# Patient Record
Sex: MALE | Race: WHITE | NOT HISPANIC OR LATINO | Employment: PART TIME | URBAN - METROPOLITAN AREA
[De-identification: names, ages, dates, MRNs, and addresses within clinical notes are randomized per-mention and may not be internally consistent; named-entity substitution may affect disease eponyms.]

---

## 2024-02-08 ENCOUNTER — OFFICE VISIT (OUTPATIENT)
Dept: OTOLARYNGOLOGY | Facility: CLINIC | Age: 29
End: 2024-02-08
Payer: COMMERCIAL

## 2024-02-08 DIAGNOSIS — H60.391 INFECTIVE OTITIS EXTERNA OF RIGHT EAR: Primary | ICD-10-CM

## 2024-02-08 PROCEDURE — 99203 OFFICE O/P NEW LOW 30 MIN: CPT | Performed by: PHYSICIAN ASSISTANT

## 2024-02-08 NOTE — PROGRESS NOTES
Rashid Larios is a 28 y.o. year old male patient with history of otitis externa involving the right ear.  The patient is here today for assessment.  He states that he was treated on multiple occasions with drops in the right ear was also debrided by his primary care physician.  He actually reports improvement in symptoms but is here today for further assessment.  All other concerns are negative.           Review of Systems   All other systems reviewed and are negative.        Physical Exam:   General appearance: No acute distress. Normal facies. Symmetric facial movement. No gross lesions of the face are noted.  The external ear structures appear normal.  Patient with minimal cerumen bilaterally removed with curette.  The ear canals patent and the tympanic membranes are intact without evidence of air-fluid levels, retraction, or congenital defects.  Anterior rhinoscopy notes essentially a midline nasal septum. Examination is noted for normal healthy mucosal membranes without any evidence of lesions, polyps, or exudate. The tongue is normally mobile. There are no lesions on the gingiva, buccal, or oral mucosa. There are no oral cavity masses.  The neck is negative for mass lymphadenopathy. The trachea and parotid are clear. The thyroid bed is grossly unremarkable. The salivary gland structures are grossly unremarkable.    Assessment/Plan   1.  Otitis externa  Patient seen in the office today for assessment of ears with history of otitis externa.  Ears appear well today.  Patient was given reassurance in that regard and I favor observation and see us back as needed.

## 2024-08-27 ENCOUNTER — APPOINTMENT (OUTPATIENT)
Dept: OTOLARYNGOLOGY | Facility: CLINIC | Age: 29
End: 2024-08-27
Payer: COMMERCIAL

## 2024-08-27 DIAGNOSIS — H60.8X9 CHRONIC ECZEMATOUS OTITIS EXTERNA, UNSPECIFIED LATERALITY: Primary | ICD-10-CM

## 2024-08-27 PROCEDURE — 99213 OFFICE O/P EST LOW 20 MIN: CPT | Performed by: PHYSICIAN ASSISTANT

## 2024-08-27 RX ORDER — MOMETASONE FUROATE 1 MG/G
CREAM TOPICAL 2 TIMES DAILY
Qty: 15 G | Refills: 2 | Status: SHIPPED | OUTPATIENT
Start: 2024-08-27 | End: 2024-09-03

## 2024-08-27 NOTE — PROGRESS NOTES
Rashid Larios is a 29 y.o. year old male patient with Earache     Patient presents to the office today for assessment of his ears.  Patient states that he had recently put an air conditioning unit in his room and noted that his ear started to ache following using the air conditioner.  He does complain of itching and an occasional film present in the ear canals.  He did recently use neomycin eardrops which helped alleviate discomfort.  He is without other ENT related concerns.        Physical Exam:   General appearance: No acute distress. Normal facies. Symmetric facial movement. No gross lesions of the face are noted.  The external ear structures appear normal. Minimal cerumen removed bilaterally. The ear canals patent and the tympanic membranes are intact without evidence of air-fluid levels, retraction, or congenital defects.  Anterior rhinoscopy notes essentially a midline nasal septum. Examination is noted for normal healthy mucosal membranes without any evidence of lesions, polyps, or exudate. The tongue is normally mobile. There are no lesions on the gingiva, buccal, or oral mucosa. There are no oral cavity masses.  The neck is negative for mass lymphadenopathy. The trachea and parotid are clear. The thyroid bed is grossly unremarkable. The salivary gland structures are grossly unremarkable.    Procedure:  Minimal cerumen removed from each ear canal with the use of curette.    Assessment/Plan     1.  Chronic otitis externa    Patient seen in the office today for assessment of ears.  Patient with suspected chronic otitis externa with eczema component.  I recommend utilization of Elocon.  We will see the patient back as needed.

## 2024-09-17 ENCOUNTER — APPOINTMENT (OUTPATIENT)
Dept: OTOLARYNGOLOGY | Facility: CLINIC | Age: 29
End: 2024-09-17
Payer: COMMERCIAL

## 2024-09-17 DIAGNOSIS — H60.63 CHRONIC OTITIS EXTERNA OF BOTH EARS, UNSPECIFIED TYPE: Primary | ICD-10-CM

## 2024-09-17 PROCEDURE — 99213 OFFICE O/P EST LOW 20 MIN: CPT | Performed by: PHYSICIAN ASSISTANT

## 2024-09-17 NOTE — PROGRESS NOTES
Rashid Larios is a 29 y.o. year old male patient with ITCHY EAR     Patient returns to the office with itchy ear complaints.  Patient with history of chronic otitis externa.  The patient seems to have following his trigger being his air conditioning unit and finds that when using the unit it makes his ears significantly more itchy.  For the last 2 weeks he has been avoiding and has had improvement.  He was hesitant on using Elocon for fear that using a Q-tip or putting the cream in the ear might increase his risk for cerumen impaction.  He is here today for further assessment and is otherwise well without other ENT concerns.          Physical Exam:   General appearance: No acute distress. Normal facies. Symmetric facial movement. No gross lesions of the face are noted.  The external ear structures appear normal.  Low-grade chronic otitis externa noted bilaterally otherwise unremarkable the ear canals patent and the tympanic membranes are intact without evidence of air-fluid levels, retraction, or congenital defects.  Anterior rhinoscopy notes essentially a midline nasal septum. Examination is noted for normal healthy mucosal membranes without any evidence of lesions, polyps, or exudate. The tongue is normally mobile. There are no lesions on the gingiva, buccal, or oral mucosa. There are no oral cavity masses.  The neck is negative for mass lymphadenopathy. The trachea and parotid are clear. The thyroid bed is grossly unremarkable. The salivary gland structures are grossly unremarkable.    Assessment/Plan   1.  Chronic otitis externa    Patient with history of chronic otitis externa.  The patient at this time is recommended to continue use of Elocon.  I discussed utilizing cotton and his ears while he sleeps if he is running the air conditioning.  We will see him back as needed.

## 2024-11-18 ENCOUNTER — ANCILLARY PROCEDURE (OUTPATIENT)
Dept: ORTHOPEDIC SURGERY | Facility: CLINIC | Age: 29
End: 2024-11-18
Payer: COMMERCIAL

## 2024-11-18 ENCOUNTER — APPOINTMENT (OUTPATIENT)
Dept: ORTHOPEDIC SURGERY | Facility: CLINIC | Age: 29
End: 2024-11-18
Payer: COMMERCIAL

## 2024-11-18 DIAGNOSIS — M25.561 RIGHT KNEE PAIN, UNSPECIFIED CHRONICITY: ICD-10-CM

## 2024-11-18 DIAGNOSIS — M22.2X1 PATELLOFEMORAL SYNDROME, BILATERAL: Primary | ICD-10-CM

## 2024-11-18 DIAGNOSIS — M22.2X2 PATELLOFEMORAL SYNDROME, BILATERAL: Primary | ICD-10-CM

## 2024-11-18 DIAGNOSIS — M25.562 LEFT KNEE PAIN, UNSPECIFIED CHRONICITY: ICD-10-CM

## 2024-11-18 PROCEDURE — 99204 OFFICE O/P NEW MOD 45 MIN: CPT | Performed by: FAMILY MEDICINE

## 2024-11-18 ASSESSMENT — PAIN SCALES - GENERAL: PAINLEVEL_OUTOF10: 3

## 2024-11-18 ASSESSMENT — PAIN DESCRIPTION - DESCRIPTORS: DESCRIPTORS: ACHING

## 2024-11-18 ASSESSMENT — PAIN - FUNCTIONAL ASSESSMENT: PAIN_FUNCTIONAL_ASSESSMENT: 0-10

## 2024-11-18 NOTE — PROGRESS NOTES
NPV B/L knee    Subjective    Patient ID: Rashid Larios is a 29 y.o. male.    Chief Complaint: Pain of the Left Knee and Pain of the Right Knee  Rashid is a very pleasant 29-year-old male presents with an over 15-year history of right knee pain and relatively recent left knee pain.  He rates the pain as a 3 out of 10 at its worst.  He had an MCL sprain in high school the right knee.  The left knee does seem to be getting little bit better, but the right knee is about the same.  Most of his discomfort comes after being seated for an extended period of time.  It bothers him when he is in the car for too long.  He did some physical therapy and high school but has not done anything recently.  He is also stopped eating inflammatory foods, increasing his exercise, and a menthol rub.  He denies any locking, catching, or instability.  He is neurovascularly intact distally.    Objective   Musculoskeletal: Bilateral knees-there is no deformity or asymmetry on either side.  He has full range of motion.  There is no warmth, redness, or effusion bilaterally he does have a positive J sign.  There is minimal crepitus with movement bilaterally.  All cruciate and collateral ligaments appear to be intact with a stable Lachman's, negative anterior and posterior drawer, negative varus and valgus stress.    Image Results:  XR knee 4+ views bilateral  Narrative: Interpreted By:  Leonardo Guajardo,   STUDY:  XR KNEE 4+ VIEWS BILATERAL      INDICATION:  Signs/Symptoms:pain.      COMPARISON:  None      ACCESSION NUMBER(S):  [id number]      ORDERING CLINICIAN:  DENEEN BERGERON      FINDINGS:  Minimal suggested degenerative changes patellofemoral joint  bilateral. No evidence of left knee fracture or lesion.      Impression: Minimal suggested patellofemoral osteoarthritis.      Signed by: Leonardo Guajardo 11/19/2024 8:02 AM  Dictation workstation:   [id number]      Assessment/Plan   Encounter Diagnoses:  Patellofemoral syndrome, bilateral    Left  knee pain, unspecified chronicity    Right knee pain, unspecified chronicity    Orders Placed This Encounter    XR knee 4+ views bilateral    Referral to Physical Therapy     I believe this will do well with conservative treatment.  We are going to start him on formal physical therapy.  Will have a low threshold for getting further imaging if he does not improve over the next 3 to 4 weeks.  Otherwise he can continue with his current medication regimen and home exercise program.  He will follow-up with me as needed.  All of his questions were answered and he agrees with the treatment plan.    ** Please excuse any errors in grammar or translation related to this dictation. Voice recognition software was utilized to prepare this document. **    Cricket Hou M.D.  Clinical , Division of Sports Medicine  Primary Care Sports Medicine  Department of Orthopedic Surgery  [facility] [place]

## 2024-11-25 PROBLEM — M22.2X2 PATELLOFEMORAL SYNDROME, BILATERAL: Status: ACTIVE | Noted: 2024-11-25

## 2024-11-25 PROBLEM — M22.2X1 PATELLOFEMORAL SYNDROME, BILATERAL: Status: ACTIVE | Noted: 2024-11-25

## 2024-11-25 PROBLEM — M25.561 RIGHT KNEE PAIN: Status: ACTIVE | Noted: 2024-11-25

## 2024-12-05 ENCOUNTER — APPOINTMENT (OUTPATIENT)
Dept: PHYSICAL THERAPY | Facility: CLINIC | Age: 29
End: 2024-12-05
Payer: COMMERCIAL

## 2024-12-19 ENCOUNTER — APPOINTMENT (OUTPATIENT)
Dept: PHYSICAL THERAPY | Facility: CLINIC | Age: 29
End: 2024-12-19
Payer: COMMERCIAL

## 2025-01-08 ENCOUNTER — EVALUATION (OUTPATIENT)
Dept: PHYSICAL THERAPY | Facility: CLINIC | Age: 30
End: 2025-01-08
Payer: COMMERCIAL

## 2025-01-08 DIAGNOSIS — M22.2X2 PATELLOFEMORAL SYNDROME, BILATERAL: Primary | ICD-10-CM

## 2025-01-08 DIAGNOSIS — M22.2X1 PATELLOFEMORAL SYNDROME, BILATERAL: Primary | ICD-10-CM

## 2025-01-08 PROCEDURE — 97161 PT EVAL LOW COMPLEX 20 MIN: CPT | Mod: GP

## 2025-01-08 PROCEDURE — 97110 THERAPEUTIC EXERCISES: CPT | Mod: GP

## 2025-01-08 ASSESSMENT — PAIN SCALES - GENERAL: PAINLEVEL_OUTOF10: 3

## 2025-01-08 ASSESSMENT — ENCOUNTER SYMPTOMS
DEPRESSION: 0
LOSS OF SENSATION IN FEET: 0
OCCASIONAL FEELINGS OF UNSTEADINESS: 0

## 2025-01-08 ASSESSMENT — PAIN - FUNCTIONAL ASSESSMENT: PAIN_FUNCTIONAL_ASSESSMENT: 0-10

## 2025-01-08 NOTE — PROGRESS NOTES
"  Physical Therapy Evaluation    Patient Name: Rashid Larios  MRN: [id number]  Time Calculation  Start Time: 0732  Stop Time: 0807  Time Calculation (min): 35 min  PT Evaluation Time Entry  PT Evaluation (Low) Time Entry: 10  PT Therapeutic Procedures Time Entry  Therapeutic Exercise Time Entry: 25                   Current Problem  1. Patellofemoral syndrome, bilateral          Insurance    Insurance reviewed   Visit number: 1  [employer] [id number] THEN AUTH IS REQ COPAY 0,DED 0 COVERAGE 100 OOP 0 30V THEN AUTH IS REQ       Subjective   General:  Patient is a 28 y/o F who is here today for c/o B knee pain (L>R). Patient reports that he initially hurt his R knee in high school, but it had gotten better. Approx 3 months ago, he started to have pain in his L knee, which has now caused his R knee to flare up. Patient denies any locking/catching/buckling in his knee, but reports that when it gets really bad he will lean against something to allow it to calm down. Denies any numbness/tingling. Patient has recently changed his diet, and he has lost some weight, which he feels has helped his pain as well.     PMHx significant of; n/a    Pt goal for PT: \"reduce or eliminate knee pain\"  Precautions:  None   Pain:  Current: 3/10, B knee, sharp and aching  Worst: 8/10, sharp  Best: 3/10  Pain Exacerbating Factors: prolonged standing, walking, bending, lifting, squatting, stairs, work duties, ADLs/IADLs  Pain Relieving Factors:     Reviewed medical screening form with pt and medical screening assessed    Imaging:   B knee x-ray: Minimal suggested patellofemoral osteoarthritis     Objective   Knee Musculoskeletal Exam  Gait  Gait additional comments: Decreased gait speed, decreased step length, increased double support time, decreased foot clearance B    Palpation    Right      Tenderness: none      Left      Tenderness: none      Range of Motion    Right      Right knee range of motion is full.      Left      Left knee range " of motion is full.      Strength    Right      Extension: 4+/5.       Flexion: 4+/5.     Left      Extension: 4+/5.       Flexion: 4+/5.      Instability    Right      Varus stress grade: normal      Valgus stress grade: normal      Anterior drawer: normal      Posterior drawer: normal      Medial Angelina test: negative      Lateral Angelina test: negative    Left      Varus stress grade: normal      Valgus stress grade: normal      Anterior drawer: normal      Posterior drawer: normal      Medial Angelina test: negative      Lateral Angelina test: negative    Special Signs    Right      J sign: mild        Patellar compression: none      Left      J sign: mild        Patellar compression: none         Outcome Measures:  Other Measures  Lower Extremity Funtional Score (LEFS): 56     Treatment: see HEP below    EDUCATION/HEP:  Access Code: [email or web address]  URL: [email or web address]  Date: 01/08/2025  Prepared by: Lisette Martines    Exercises  - Supine Hamstring Stretch with Strap  - 1 x daily - 7 x weekly - 3 sets - 20-30 sec hold  - Supine ITB Stretch with Strap  - 1 x daily - 7 x weekly - 3 sets - 20-30 sec hold  - Prone Quadriceps Stretch with Strap  - 1 x daily - 7 x weekly - 3 sets - 20-30 sec hold  - Active Straight Leg Raise with Quad Set  - 1 x daily - 7 x weekly - 2 sets - 10 reps  - Straight Leg Raise with External Rotation  - 1 x daily - 7 x weekly - 2 sets - 10 reps  - Sidelying Hip Abduction  - 1 x daily - 7 x weekly - 2 sets - 10 reps  - Sidelying Hip Adduction  - 1 x daily - 7 x weekly - 2 sets - 10 reps  - Supine Bridge with Resistance Band  - 1 x daily - 7 x weekly - 2 sets - 10 reps - 2-3 sec hold  - Clamshell with Resistance  - 1 x daily - 7 x weekly - 2 sets - 10 reps    Assessment:  Patient is  a 28 y/o M who participated in PT evaluation this date for c/o B knee pain (L>R). Patient presents with pain, decreased muscular endurance, impaired gait and impaired balance. Due to  these impairments, pt has increased difficulty with prolonged sitting and standing, walking, bending, lifting, squatting, stairs, performing work duties and performing ADLs/IADLs. Pt would benefit from PT services to decrease pain, increase ROM/tissue mobility, improve strength, gait and balance to facilitate return to prior level of activities as able.    Problem List: activity limitations, ADLs/IADLs/self care skills, decreased functional level, decreased knowledge of HEP, decreased knowledge of precautions, flexibility, pain, participation restrictions, posture, range of motion/joint mobility and strength.     Clinical Presentation: Stable    Level of Complexity: Low     Goals:  Pt will be independent with advanced HEP   Pt will increase Polo LE strength 5/5 including good eccentric quad to perform all functional mobility  Pt will demo B LE SLS >/=10 sec without UE assist on compliant surface for functional carryover into dynamic balance  Pt will negotiate flight of stairs mod I reciprocally without increased knee pain  Pt will ambulate community distances independent over varying surfaces/inclines without increased B knee pain                    Pt will improve LEFS score by at least 9 points (MCID) to indicate significant improvement in functional abilities.      Plan  1 visit in 2-3 weeks    Skilled therapeutic intervention to address the above mentioned physical and functional impairments and limitations including, but not limited to: patient education, therapeutic exercise, therapeutic activity, manual therapy, body mechanics training, dry needling, blood flow restriction training, instrumented soft tissue mobilization, manual soft tissue mobilization, gait retraining, biofeedback, cryotherapy, electrical stimulation, home program development, hot pack, taping, neuromuscular re-education, self-care/home management, and vasopneumatic compression.

## 2025-01-23 ENCOUNTER — APPOINTMENT (OUTPATIENT)
Dept: PHYSICAL THERAPY | Facility: CLINIC | Age: 30
End: 2025-01-23
Payer: COMMERCIAL

## 2025-04-07 ENCOUNTER — APPOINTMENT (OUTPATIENT)
Dept: OTOLARYNGOLOGY | Facility: CLINIC | Age: 30
End: 2025-04-07
Payer: COMMERCIAL

## 2025-05-07 ENCOUNTER — APPOINTMENT (OUTPATIENT)
Dept: OTOLARYNGOLOGY | Facility: CLINIC | Age: 30
End: 2025-05-07
Payer: COMMERCIAL

## 2025-05-07 ENCOUNTER — CLINICAL SUPPORT (OUTPATIENT)
Dept: AUDIOLOGY | Facility: CLINIC | Age: 30
End: 2025-05-07
Payer: COMMERCIAL

## 2025-05-07 DIAGNOSIS — Z01.10 ENCOUNTER FOR HEARING EXAMINATION WITHOUT ABNORMAL FINDINGS: Primary | ICD-10-CM

## 2025-05-07 DIAGNOSIS — H93.13 TINNITUS OF BOTH EARS: ICD-10-CM

## 2025-05-07 DIAGNOSIS — R42 DIZZINESS AND GIDDINESS: Primary | ICD-10-CM

## 2025-05-07 DIAGNOSIS — R42 DIZZINESS: ICD-10-CM

## 2025-05-07 DIAGNOSIS — H93.293 ABNORMAL AUDITORY PERCEPTION OF BOTH EARS: ICD-10-CM

## 2025-05-07 PROCEDURE — 92557 COMPREHENSIVE HEARING TEST: CPT | Performed by: AUDIOLOGIST

## 2025-05-07 PROCEDURE — 92567 TYMPANOMETRY: CPT | Performed by: AUDIOLOGIST

## 2025-05-07 NOTE — PROGRESS NOTES
Rashid, age 29, was seen today for a hearing evaluation during his ENT visit with Dr. Willingham.  He reported he had a TIA 8 weeks ago and ever since has been experiencing some issues related to dizziness/imbalance as well as muffled hearing with tinnitus.      Results:  Otoscopy revealed clear ear canals and tympanic membranes were visualized bilaterally.  Tympanometry revealed normal, Type A tympanograms, indicating normal ear canal volume, peak pressure and compliance bilaterally.  Audiometric thresholds revealed normal hearing sensitivity bilaterally.  Word recognition scores were excellent bilaterally.    Recommendations:  Follow-up with PCP, Dr. Nieves, as medically directed.  Follow-up with ENT, Dr. Willingham, as medically directed.  Retest hearing as directed or should concern for a change in hearing arise.

## 2025-05-07 NOTE — PROGRESS NOTES
Subjective   Patient ID: Rashid Larios is a 29 y.o. male who presents for Tinnitus and BALANCE ISSUES.    HPI  Patient returns, being seen for tinnitus and balance issues. Has been experiencing muffled hearing, tinnitus, and dizziness/imbalance since TIA 8 weeks ago. Ear symptoms are worse in right ear. Dizziness/imbalance is intermittent, brief. Patient denies room-spinning sensation. He is being worked up with Cardiology continues to follow with Neurology.     All remaining head neck inquiry otherwise negative.     Review of Systems   Constitutional: Negative.    HENT: Negative.     Respiratory: Negative.     Cardiovascular: Negative.    Neurological: Negative.      Physical Exam  General appearance: No acute distress. Normal facies. Symmetric facial movement. No gross lesions of the face are noted.  Ears:  The external ear structures appear normal. The ear canals patent and the tympanic membranes are intact without evidence of air-fluid levels, retraction, or congenital defects.    Nose:  Anterior rhinoscopy notes essentially a midline nasal septum. Examination is noted for normal healthy mucosal membranes without any evidence of lesions, polyps, or exudate.   Throat/Oral mucosa:  The tongue is normally mobile. There are no lesions on the gingiva, buccal, or oral mucosa. There are no oral cavity masses.  Neck:  The neck is negative for mass lymphadenopathy. The trachea and parotid are clear. The thyroid bed is grossly unremarkable. The salivary gland structures are grossly unremarkable.    Audiogram:  Completed today shows normal hearing sensitivity bilaterally. Also with bilateral Type A tympanograms, excellent WRS.     Assessment/Plan   Reassurance provided that physical examination and Audiogram completed today do not show anything acutely concerning from an ENT standpoint that might explain patient's symptoms. Would recommend continuing follow-up with Neuro and Cardiology. Certainly can see me back with any  changes or concerns as needed.

## 2025-05-28 ENCOUNTER — APPOINTMENT (OUTPATIENT)
Dept: CARDIOLOGY | Facility: CLINIC | Age: 30
End: 2025-05-28
Payer: COMMERCIAL

## 2025-06-11 ENCOUNTER — EVALUATION (OUTPATIENT)
Dept: PHYSICAL THERAPY | Facility: CLINIC | Age: 30
End: 2025-06-11
Payer: COMMERCIAL

## 2025-06-11 DIAGNOSIS — M22.2X2 PATELLOFEMORAL SYNDROME, BILATERAL: Primary | ICD-10-CM

## 2025-06-11 DIAGNOSIS — M22.2X1 PATELLOFEMORAL SYNDROME, BILATERAL: Primary | ICD-10-CM

## 2025-06-11 PROCEDURE — 97110 THERAPEUTIC EXERCISES: CPT | Mod: GP

## 2025-06-11 PROCEDURE — 97164 PT RE-EVAL EST PLAN CARE: CPT | Mod: GP

## 2025-06-11 ASSESSMENT — PATIENT HEALTH QUESTIONNAIRE - PHQ9
SUM OF ALL RESPONSES TO PHQ9 QUESTIONS 1 AND 2: 0
2. FEELING DOWN, DEPRESSED OR HOPELESS: NOT AT ALL
1. LITTLE INTEREST OR PLEASURE IN DOING THINGS: NOT AT ALL

## 2025-06-11 ASSESSMENT — ENCOUNTER SYMPTOMS
DEPRESSION: 0
OCCASIONAL FEELINGS OF UNSTEADINESS: 0
LOSS OF SENSATION IN FEET: 0

## 2025-06-11 NOTE — PROGRESS NOTES
"  Physical Therapy Re-Evaluation    Patient Name: Rashid Larios  MRN: [id number]  Time Calculation  Start Time: 0748  Stop Time: 0816  Time Calculation (min): 28 min  PT Evaluation Time Entry  PT Re-Evaluation Time Entry: 15  PT Therapeutic Procedures Time Entry  Therapeutic Exercise Time Entry: 13        Current Problem  1. Patellofemoral syndrome, bilateral  Referral to Physical Therapy    Follow Up In Physical Therapy        Insurance    Insurance reviewed   Visit number: 1  [employer] [id number] THEN AUTH IS REQ COPAY 0,DED 0 COVERAGE 100 OOP 0 30V THEN AUTH IS REQ       Subjective   General:  Patient is a 28 y/o male who is here today for c/o B knee pain (L>R). Patient reports that he initially hurt his R knee in high school, but it had gotten better and has worsened over the past 3 weeks despite consistency with previous therapy exercises provided 5 months ago. Patient notes previous therapy helped with stretches. He states no changes in daily habits but reports increasing L knee pain over the past few days. He states improvement in pain after taking vitamins/minerals and stretching daily. Without vitamins, he notes return in bilateral knee pain. He notes trialing running for the first time 3 weeks ago to help with cardiovascular endurance for heart condition but states that may have increased his pain. Patient denies any locking/catching/buckling in his knee, but reports that when it gets really bad he will lean against something to allow it to calm down. Denies any numbness/tingling.     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    Standing tolerance: 4-5 hours  Walking tolerance: 60+ minutes  Sitting tolerance: 60+ minutes    PMHx significant of; n/a    Pt goal for PT: \"reduce or eliminate knee pain\"  Precautions:  None   Pain:  Current: 1/10, B knee aching  Worst: 4-5/10   Best: 0/10  Pain Exacerbating Factors: prolonged standing, walking, bending, lifting, squatting, stairs, work duties, ADLs/IADLs  Pain " Relieving Factors: Stretches, infrared therapy    Reviewed medical screening form with pt and medical screening assessed    Imaging:   B knee x-ray: Minimal suggested patellofemoral osteoarthritis     Objective   Knee Musculoskeletal Exam  Gait    Gait is normal.    Palpation    Right      Tenderness: none      Left      Tenderness: none      Range of Motion    Right      Right knee range of motion is full.      Left      Left knee range of motion is full.      Strength    Strength additional comments: See objective section below     Instability    Right      Varus stress grade: normal      Valgus stress grade: normal      Anterior drawer: normal      Posterior drawer: normal      Medial Angelina test: negative      Lateral Angelina test: negative    Left      Varus stress grade: normal      Valgus stress grade: normal      Anterior drawer: normal      Posterior drawer: normal      Medial Angelina test: negative      Lateral Angelina test: negative    Special Signs    Right      J sign: mild        Patellar compression: none      Left      J sign: mild        Patellar compression: none     (+) = pain indicated during testing    R/L Hip Flex: 34#/34#  R/L Hip Ext: 60#/60#  R/L Hip Abd: 32#/36#  R/L Hip Add: 27#/37# (+)  R/L Knee Ext: 44#/44#  R/L Knee Flex: 41#/41#    90/90 R/L: 130 deg nevaeh    FADDIR R/L: -/-  PRUDENCE R/L: 10 in/12 in      Outcome Measures:    LEFS: 56/80    Treatment: see HEP below    EDUCATION/HEP:  Dynamic HS stretch x12 5 sec hold ea side  Figure 4 stretch :30 sec ea side  ITB/TFL stretch with band :30 sec ea side  Bridge plus abduction 2x12 GTB around knees  Captain escalera x10 ea side    Assessment:  Patient is  a 28 y/o M who participated in PT evaluation this date for c/o B knee pain (L>R). Patient presents with pain decreased symmetrical strength, decreased muscle length noted bilaterally at hamstrings and hip complex. Due to these impairments, pt has increased difficulty with prolonged  standing, walking, bending, lifting, squatting, stairs, performing work duties and performing ADLs/IADLs. Pt would benefit from PT services to decrease pain, increase ROM/tissue mobility, improve strength, gait and balance to facilitate return to prior level of activities as able. Patient would benefit from PT services to address current impairments and facilitate improvement in current activity limits. Educated patient on current POC, initial HEP and current examination findings. Patient verbalized understanding of all education and instruction provided today.      Problem List: activity limitations, ADLs/IADLs/self care skills, decreased functional level, decreased knowledge of HEP, decreased knowledge of precautions, flexibility, pain, participation restrictions, posture, range of motion/joint mobility and strength.     Clinical Presentation: Stable    Level of Complexity: Low     Goals:  Pt will be independent with advanced HEP   Pt will increase LE strength within 90% LIS to demonstrate improved knee stability  Pt will demo L/R 90/90 >/= 150 deg to demonstrate improved hamstring length.  Pt will negotiate flight of stairs mod I reciprocally without increased knee pain  Pt will ambulate community distances independent over varying surfaces/inclines without increased B knee pain       Pt will self-report being able to stand at work for entire shift with maximum reported pain of 1/10               Pt will improve LEFS score by at least 9 points (MCID) to indicate significant improvement in functional abilities.      Plan  1 visit in 3 weeks    Skilled therapeutic intervention to address the above mentioned physical and functional impairments and limitations including, but not limited to: patient education, therapeutic exercise, therapeutic activity, manual therapy, body mechanics training, dry needling, blood flow restriction training, instrumented soft tissue mobilization, manual soft tissue mobilization, gait  retraining, biofeedback, cryotherapy, electrical stimulation, home program development, hot pack, taping, neuromuscular re-education, self-care/home management, and vasopneumatic compression.

## 2025-06-30 NOTE — PROGRESS NOTES
Cardiac Electrophysiology Office Visit   I had the pleasure seeing Rashid Larios    Current Outpatient Medications   Medication Instructions    mometasone (Elocon) 0.1 % cream Topical, 2 times daily    omega-3/dha/epa/fish oil (OMEGA-3 FISH OIL ORAL) 1 capsule, oral, Daily      Subjective    Rashid Larios is a 29 y.o. male .        Chief Complaint   Patient presents with    Non Sustained Ventricular Tachycardia     OUTPATIENT CONSULTATION: Cardiac Electrophysiology  DOS: July 07, 2025   REASON: NSVT - Evaluation and Treatment   REFERRING: Dr. La Nena TERRY     Rashid Larios has a past medical history of TIA, HLD. +YUNIOR    TIA on 2/23/2025 at work:  Felt short episodes of palpitation, dizziness and lost of  peripheral vision bilaterality, confusion, drove home, Sxs last about 90 min. Sxs subsided when he got at ED.  He was seen in [facility] ED at the time where his work up was largely benign.     CARDIAC HISTORY:  NSVT - 12 beat run of VT seen on a 3-day holter monitor placed by cardiologist. Repeat 14 day monitor was unremarkable.   Near Syncope - He reports several episodes of near syncope. He states that he will start to have CP and will feel like it is harder to breath, then he will become lightheaded and dizzy for a few seconds before symptoms resolve.  Palpitation and CP has greatly improved with metoprolol 25 mg daily.    RELEVANT TESTING:     Transthoracic Echocardiogram 04/22/2025  CONCLUSIONS:   - Exam indication: TIA   - The left ventricle is normal in size. Left ventricular systolic function is   normal. EF = 56 ± 5% (2D biplane) Normal left ventricular diastolic function.   - The right ventricle is normal in size. Right ventricular systolic function is   normal.   - There are no significant valvular abnormalities.   - Agitated saline was administered to rule out PFO [clips: [id number]]. There is no   evidence of intracardiac shunting as detected by agitated saline contrast with valsalva.     Event Monitor  "Zio 14 Days 03/13/2025-03/27/2025   Patient had a min HR of 45 bpm, max HR of 143 bpm, and avg HR of 74 bpm. Predominant underlying rhythm was Sinus Rhythm. Isolated SVEs were rare (<1.0%), and no SVE Couplets or SVE Triplets were present. Isolated VEs were rare (<1.0%, 2485), VE Couplets were rare (<1.0%, 22), and VE Triplets were rare (<1.0%, 1). Ventricular Bigeminy and Trigeminy were present.     Nuclear Stress Test 03/13/2025  CONCLUSIONS:    1. SPECT Perfusion Study: Normal.    2. There is no scintigraphic evidence for inducible ischemia.    3. No evidence of scarred myocardium.    4. Left ventricle is normal in size. The left ventricle systolic   function is normal.    5. Right ventricle is normal in size. The right ventricle systolic   function is normal.    6. This is a low risk scan.   Gated Stress FBP    LVEF % 58        Lab Review:   No results found for: \"WBC\", \"HGB\", \"HCT\", \"PLT\", \"CHOL\", \"TRIG\", \"HDL\", \"LDLDIRECT\", \"ALT\", \"AST\", \"NA\", \"K\", \"CL\", \"CREATININE\", \"BUN\", \"CO2\", \"TSH\", \"PSA\", \"INR\", \"GLUF\", \"HGBA1C\", \"ALBUR\"    Review of Systems   Constitutional: Negative.   HENT: Negative.     Eyes: Negative.    Cardiovascular:  Positive for irregular heartbeat, near-syncope and palpitations.   Respiratory: Negative.     Endocrine: Negative.    Skin: Negative.    Musculoskeletal:  Positive for joint pain.   Gastrointestinal: Negative.    Genitourinary: Negative.    Neurological:  Positive for dizziness and light-headedness.   Psychiatric/Behavioral: Negative.          Objective    Constitutional:       Appearance: Healthy appearance. Not in distress.   Eyes:      Pupils: Pupils are equal, round, and reactive to light.   Neck:      Thyroid: Thyroid normal.      Vascular: JVD normal.   Pulmonary:      Effort: Pulmonary effort is normal.      Breath sounds: Normal breath sounds.   Cardiovascular:      Normal rate. Regular rhythm. S1 with normal intensity. S2 with normal intensity.       Murmurs: There is no " murmur.      No gallop.  No click. No rub.   Edema:     Peripheral edema absent.   Musculoskeletal: Normal range of motion.      Cervical back: Normal range of motion and neck supple. Skin:     General: Skin is warm and dry.   Neurological:      General: No focal deficit present.      Mental Status: Alert and oriented to person, place and time.          Assessment/Plan     Hx of TIA, near syncope and non sust. VT 12 beat (repeat 14-day monitor was negative)  Neuro work up was negative  Structurally normal heart and LV function. Normal ECG.  No FMHx of genetic disorders or sudden death.    We will Increase metoprolol SR to 25mg bid  Reassess in 6 weeks with Viola Mittal CNP  If palpitation / dizziness continues - will implant loop monitor vs. Cathter Ablation      MD Pepito Santana Master Clinician of Cardiovascular [place].   [facility] [place].   Director of Electrophysiology Center  Professor of Medicine.   [employer].

## 2025-07-02 ENCOUNTER — APPOINTMENT (OUTPATIENT)
Dept: PHYSICAL THERAPY | Facility: CLINIC | Age: 30
End: 2025-07-02
Payer: COMMERCIAL

## 2025-07-02 ENCOUNTER — OFFICE VISIT (OUTPATIENT)
Dept: ORTHOPEDIC SURGERY | Facility: CLINIC | Age: 30
End: 2025-07-02
Payer: COMMERCIAL

## 2025-07-02 VITALS — WEIGHT: 193 LBS | BODY MASS INDEX: 27.63 KG/M2 | HEIGHT: 70 IN

## 2025-07-02 DIAGNOSIS — R76.8 POSITIVE ANA (ANTINUCLEAR ANTIBODY): ICD-10-CM

## 2025-07-02 DIAGNOSIS — M76.51 PATELLAR TENDINITIS OF BOTH KNEES: ICD-10-CM

## 2025-07-02 DIAGNOSIS — M22.2X2 PATELLOFEMORAL SYNDROME, BILATERAL: ICD-10-CM

## 2025-07-02 DIAGNOSIS — M22.2X1 PATELLOFEMORAL SYNDROME, BILATERAL: ICD-10-CM

## 2025-07-02 DIAGNOSIS — M76.52 PATELLAR TENDINITIS OF BOTH KNEES: ICD-10-CM

## 2025-07-02 PROCEDURE — 3008F BODY MASS INDEX DOCD: CPT | Performed by: ORTHOPAEDIC SURGERY

## 2025-07-02 PROCEDURE — 99213 OFFICE O/P EST LOW 20 MIN: CPT | Performed by: ORTHOPAEDIC SURGERY

## 2025-07-02 PROCEDURE — 99203 OFFICE O/P NEW LOW 30 MIN: CPT | Performed by: ORTHOPAEDIC SURGERY

## 2025-07-02 NOTE — PROGRESS NOTES
"    History of Present Illness:   Patient presents today for evaluation of his ongoing bilateral knee pain.  Patient states that he has had knee pain since high school, it tends to alternate between right and left.  He notes that he has tried anti-inflammatories, rest, ice as well as dedicated physical therapy.  The physical therapy does seem to help him.  He notes that he was seeing a rheumatologist per family recommendation, he had resulted in a positive antinuclear antibody test and has not yet followed up with them.  He had received an MRI as well of the left knee, is here today to review and discuss results.    He works in a [employer], physically active when able.  He also notes a significant medical history of recent TIA and likely atrial fibrillation, describes a \"heart murmur\" at today's appointment, states he is unable to take NSAIDs and is following up with cardiology.    Medical History[1]  Surgical History[2]  Current Medications[3]    Review of Systems   GENERAL: Negative  GI: Negative  MUSCULOSKELETAL: See HPI  SKIN: Negative  NEURO:  Negative    Physical Examination:  Bilateral knee:  Skin healthy and intact  No gross swelling or ecchymosis  No varus malalignment  No valgus malalignment   No effusion  ROM:  Full flexion   Full extension  No pain with internal rotation of the hip     No tenderness to palpation over medial joint line  No tenderness to palpation over lateral joint line  Tenderness to palpation over the anterior knee, patellar tendon, as well as patellar compression.  No laxity to valgus stress  No laxity to varus stress  Negative Lachman´s test  Negative anterior drawer test  Negative posterior drawer test  Negative Angelina´s test     Neurovascular exam normal distally    Imaging:  X-rays of bilateral knee reveal no acute fracture or dislocation, good alignment and position, no significant evidence of DJD.  MRI of the left knee reveals no acute internal derangement, no effusion, no " significant cartilage wear.    Assessment:   Patient with bilateral knee pain, possibly rheumatologic etiology, small underlying concern for trochlear DJD    Plan:  We discussed a variety of treatment option with the patient including rest, ice, anti-inflammatory sparingly given heart condition.  We ultimately recommended that we would like him to see a rheumatologist for second opinion/follow-up regarding the possible rheumatologic etiology of his multijoint pain.  Given his age, activity level, normal MRI and x-ray, we do feel that with a positive YUNIOR on board there is high clinical suspicion he may have rheumatologic arthritis of some kind.  Discussed referral for this, as well as topical anti-inflammatories, ice, rest compression and dedicated physical therapy.    Crow Raines PA-C     In a face to face encounter, I evaluated the patient and performed a physical examination, discussed pertinent diagnostic studies if indicated and discussed diagnosis and management strategies with both the patient and physician assistant / nurse practitioner.  I reviewed the PA/NP's note and agree with the documented findings and plan of care.    Discussed with the patient MRI demonstrates some very minimal chondral wear patella medial compartment but it is incredibly subtle.  We discussed that overall no significant structural derangement.  He has had pain in both knees as well as some additional joint states he has a positive YUNIOR and a grandmother with rheumatoid arthritis we discussed we recommend a further rheum workup.  Does not tolerate NSAIDs well reviewed some topical modalities.    Alon Patel MD           [1] History reviewed. No pertinent past medical history.  [2] History reviewed. No pertinent surgical history.  [3]   Current Outpatient Medications:     mometasone (Elocon) 0.1 % cream, Apply topically 2 times a day for 7 days., Disp: 15 g, Rfl: 2    omega-3/dha/epa/fish oil (OMEGA-3 FISH OIL ORAL), Take 1 capsule  by mouth once daily., Disp: , Rfl:

## 2025-07-07 ENCOUNTER — HOSPITAL ENCOUNTER (OUTPATIENT)
Dept: CARDIOLOGY | Facility: CLINIC | Age: 30
Discharge: HOME | End: 2025-07-07
Payer: COMMERCIAL

## 2025-07-07 ENCOUNTER — OFFICE VISIT (OUTPATIENT)
Dept: CARDIOLOGY | Facility: CLINIC | Age: 30
End: 2025-07-07
Payer: COMMERCIAL

## 2025-07-07 VITALS
BODY MASS INDEX: 34.44 KG/M2 | WEIGHT: 232.5 LBS | OXYGEN SATURATION: 96 % | SYSTOLIC BLOOD PRESSURE: 129 MMHG | DIASTOLIC BLOOD PRESSURE: 86 MMHG | HEIGHT: 69 IN | HEART RATE: 70 BPM

## 2025-07-07 DIAGNOSIS — I47.29 NON-SUSTAINED VENTRICULAR TACHYCARDIA (MULTI): ICD-10-CM

## 2025-07-07 DIAGNOSIS — I47.29 NON-SUSTAINED VENTRICULAR TACHYCARDIA (MULTI): Primary | ICD-10-CM

## 2025-07-07 DIAGNOSIS — R55 NEAR SYNCOPE: Primary | ICD-10-CM

## 2025-07-07 PROCEDURE — 93010 ELECTROCARDIOGRAM REPORT: CPT | Performed by: INTERNAL MEDICINE

## 2025-07-07 PROCEDURE — 93005 ELECTROCARDIOGRAM TRACING: CPT

## 2025-07-07 PROCEDURE — 3008F BODY MASS INDEX DOCD: CPT | Performed by: INTERNAL MEDICINE

## 2025-07-07 PROCEDURE — 99204 OFFICE O/P NEW MOD 45 MIN: CPT | Performed by: INTERNAL MEDICINE

## 2025-07-07 RX ORDER — METOPROLOL SUCCINATE 25 MG/1
25 TABLET, EXTENDED RELEASE ORAL
Qty: 90 TABLET | Refills: 1 | Status: SHIPPED | OUTPATIENT
Start: 2025-07-07

## 2025-07-07 RX ORDER — METOPROLOL SUCCINATE 25 MG/1
0.5 TABLET, EXTENDED RELEASE ORAL
COMMUNITY
Start: 2025-07-05 | End: 2025-07-07 | Stop reason: SDUPTHER

## 2025-07-07 RX ORDER — NEOMYCIN SULFATE, POLYMYXIN B SULFATE, HYDROCORTISONE 3.5; 10000; 1 MG/ML; [USP'U]/ML; MG/ML
SOLUTION/ DROPS AURICULAR (OTIC) AS NEEDED
COMMUNITY
Start: 2025-03-18

## 2025-07-07 RX ORDER — FAMOTIDINE 20 MG/1
1 TABLET, FILM COATED ORAL
COMMUNITY
Start: 2025-03-13

## 2025-07-07 RX ORDER — ACETAMINOPHEN 325 MG/1
650 TABLET ORAL EVERY 6 HOURS PRN
COMMUNITY
Start: 2025-03-13

## 2025-07-07 ASSESSMENT — ENCOUNTER SYMPTOMS
DIZZINESS: 1
CONSTITUTIONAL NEGATIVE: 1
PSYCHIATRIC NEGATIVE: 1
RESPIRATORY NEGATIVE: 1
LIGHT-HEADEDNESS: 1
OCCASIONAL FEELINGS OF UNSTEADINESS: 0
ENDOCRINE NEGATIVE: 1
PALPITATIONS: 1
GASTROINTESTINAL NEGATIVE: 1
LOSS OF SENSATION IN FEET: 0
EYES NEGATIVE: 1
NEAR-SYNCOPE: 1
IRREGULAR HEARTBEAT: 1
DEPRESSION: 0

## 2025-07-07 ASSESSMENT — PAIN SCALES - GENERAL: PAINLEVEL_OUTOF10: 0-NO PAIN

## 2025-07-07 ASSESSMENT — PATIENT HEALTH QUESTIONNAIRE - PHQ9
SUM OF ALL RESPONSES TO PHQ9 QUESTIONS 1 AND 2: 0
1. LITTLE INTEREST OR PLEASURE IN DOING THINGS: NOT AT ALL
2. FEELING DOWN, DEPRESSED OR HOPELESS: NOT AT ALL

## 2025-07-07 ASSESSMENT — COLUMBIA-SUICIDE SEVERITY RATING SCALE - C-SSRS
1. IN THE PAST MONTH, HAVE YOU WISHED YOU WERE DEAD OR WISHED YOU COULD GO TO SLEEP AND NOT WAKE UP?: NO
2. HAVE YOU ACTUALLY HAD ANY THOUGHTS OF KILLING YOURSELF?: NO
6. HAVE YOU EVER DONE ANYTHING, STARTED TO DO ANYTHING, OR PREPARED TO DO ANYTHING TO END YOUR LIFE?: NO

## 2025-07-09 ENCOUNTER — APPOINTMENT (OUTPATIENT)
Dept: PHYSICAL THERAPY | Facility: CLINIC | Age: 30
End: 2025-07-09
Payer: COMMERCIAL

## 2025-07-18 DIAGNOSIS — I47.29 NON-SUSTAINED VENTRICULAR TACHYCARDIA (MULTI): ICD-10-CM

## 2025-07-18 RX ORDER — METOPROLOL SUCCINATE 25 MG/1
25 TABLET, EXTENDED RELEASE ORAL 2 TIMES DAILY
Qty: 180 TABLET | Refills: 1 | Status: SHIPPED | OUTPATIENT
Start: 2025-07-18

## 2025-08-18 ENCOUNTER — APPOINTMENT (OUTPATIENT)
Dept: CARDIOLOGY | Facility: CLINIC | Age: 30
End: 2025-08-18
Payer: COMMERCIAL

## 2025-08-18 ENCOUNTER — OFFICE VISIT (OUTPATIENT)
Dept: CARDIOLOGY | Facility: CLINIC | Age: 30
End: 2025-08-18
Payer: COMMERCIAL

## 2025-08-18 VITALS
SYSTOLIC BLOOD PRESSURE: 119 MMHG | BODY MASS INDEX: 34.33 KG/M2 | OXYGEN SATURATION: 97 % | HEART RATE: 67 BPM | HEIGHT: 69 IN | DIASTOLIC BLOOD PRESSURE: 80 MMHG

## 2025-08-18 DIAGNOSIS — I47.29 NON-SUSTAINED VENTRICULAR TACHYCARDIA (MULTI): Primary | ICD-10-CM

## 2025-08-18 DIAGNOSIS — R55 NEAR SYNCOPE: ICD-10-CM

## 2025-08-18 PROBLEM — E78.5 HYPERLIPIDEMIA: Status: ACTIVE | Noted: 2025-03-26

## 2025-08-18 LAB
ATRIAL RATE: 67 BPM
P AXIS: 57 DEGREES
P OFFSET: 204 MS
P ONSET: 149 MS
PR INTERVAL: 132 MS
Q ONSET: 215 MS
QRS COUNT: 11 BEATS
QRS DURATION: 96 MS
QT INTERVAL: 384 MS
QTC CALCULATION(BAZETT): 405 MS
QTC FREDERICIA: 398 MS
R AXIS: 44 DEGREES
T AXIS: 34 DEGREES
T OFFSET: 407 MS
VENTRICULAR RATE: 67 BPM

## 2025-08-18 PROCEDURE — 93010 ELECTROCARDIOGRAM REPORT: CPT | Performed by: INTERNAL MEDICINE

## 2025-08-18 PROCEDURE — 99214 OFFICE O/P EST MOD 30 MIN: CPT | Performed by: NURSE PRACTITIONER

## 2025-08-18 PROCEDURE — 99212 OFFICE O/P EST SF 10 MIN: CPT

## 2025-08-18 PROCEDURE — 93005 ELECTROCARDIOGRAM TRACING: CPT | Performed by: NURSE PRACTITIONER

## 2025-08-18 ASSESSMENT — PAIN SCALES - GENERAL: PAINLEVEL_OUTOF10: 0-NO PAIN

## 2025-08-25 ASSESSMENT — ENCOUNTER SYMPTOMS
COUGH: 0
IRREGULAR HEARTBEAT: 1
DOUBLE VISION: 0
VOMITING: 0
SORE THROAT: 0
LIGHT-HEADEDNESS: 0
DIAPHORESIS: 0
FEVER: 0
HEADACHES: 0
ORTHOPNEA: 0
NEAR-SYNCOPE: 0
SYNCOPE: 0
SNORING: 0
FALLS: 0
BLURRED VISION: 0
DYSPNEA ON EXERTION: 0
SHORTNESS OF BREATH: 0
MYALGIAS: 0
PND: 0
WEAKNESS: 0
DIARRHEA: 0
HEMOPTYSIS: 0
PALPITATIONS: 0
SPUTUM PRODUCTION: 0
NAUSEA: 0
DIZZINESS: 1
ABDOMINAL PAIN: 0

## 2025-08-28 ENCOUNTER — APPOINTMENT (OUTPATIENT)
Dept: CARDIOLOGY | Facility: CLINIC | Age: 30
End: 2025-08-28
Payer: COMMERCIAL

## 2025-09-03 ENCOUNTER — OFFICE VISIT (OUTPATIENT)
Facility: CLINIC | Age: 30
End: 2025-09-03
Payer: COMMERCIAL

## 2025-09-03 VITALS
BODY MASS INDEX: 36.67 KG/M2 | WEIGHT: 247.6 LBS | DIASTOLIC BLOOD PRESSURE: 81 MMHG | HEART RATE: 66 BPM | TEMPERATURE: 98.2 F | SYSTOLIC BLOOD PRESSURE: 115 MMHG | HEIGHT: 69 IN

## 2025-09-03 DIAGNOSIS — G45.9 TIA (TRANSIENT ISCHEMIC ATTACK): ICD-10-CM

## 2025-09-03 DIAGNOSIS — G89.29 CHRONIC PAIN OF BOTH KNEES: Primary | ICD-10-CM

## 2025-09-03 DIAGNOSIS — M25.561 CHRONIC PAIN OF BOTH KNEES: Primary | ICD-10-CM

## 2025-09-03 DIAGNOSIS — M25.562 CHRONIC PAIN OF BOTH KNEES: Primary | ICD-10-CM

## 2025-09-03 PROCEDURE — 3008F BODY MASS INDEX DOCD: CPT | Performed by: STUDENT IN AN ORGANIZED HEALTH CARE EDUCATION/TRAINING PROGRAM

## 2025-09-03 PROCEDURE — 99204 OFFICE O/P NEW MOD 45 MIN: CPT | Performed by: STUDENT IN AN ORGANIZED HEALTH CARE EDUCATION/TRAINING PROGRAM

## 2025-09-04 ENCOUNTER — HOSPITAL ENCOUNTER (OUTPATIENT)
Dept: CARDIOLOGY | Facility: CLINIC | Age: 30
Discharge: HOME | End: 2025-09-04
Payer: COMMERCIAL

## 2025-09-04 DIAGNOSIS — I47.29 NON-SUSTAINED VENTRICULAR TACHYCARDIA (MULTI): ICD-10-CM

## 2025-09-04 DIAGNOSIS — R55 NEAR SYNCOPE: ICD-10-CM

## 2025-09-04 PROCEDURE — 93270 REMOTE 30 DAY ECG REV/REPORT: CPT

## 2025-09-05 LAB
ALBUMIN SERPL-MCNC: 4.6 G/DL (ref 3.6–5.1)
ALP SERPL-CCNC: 104 U/L (ref 36–130)
ALT SERPL-CCNC: 40 U/L (ref 9–46)
ANA SER QL IF: NEGATIVE
ANION GAP SERPL CALCULATED.4IONS-SCNC: 7 MMOL/L (CALC) (ref 7–17)
AST SERPL-CCNC: 24 U/L (ref 10–40)
B2 GLYCOPROT1 IGA SER-ACNC: NORMAL
B2 GLYCOPROT1 IGG SER-ACNC: NORMAL
B2 GLYCOPROT1 IGM SER-ACNC: NORMAL
BASOPHILS # BLD AUTO: 21 CELLS/UL (ref 0–200)
BASOPHILS NFR BLD AUTO: 0.4 %
BILIRUB SERPL-MCNC: 0.4 MG/DL (ref 0.2–1.2)
BUN SERPL-MCNC: 17 MG/DL (ref 7–25)
C3 SERPL-MCNC: 160 MG/DL (ref 82–185)
C4 SERPL-MCNC: 32 MG/DL (ref 15–53)
CALCIUM SERPL-MCNC: 9.6 MG/DL (ref 8.6–10.3)
CARDIOLIPIN IGA SER IA-ACNC: 2 APL-U/ML
CARDIOLIPIN IGG SER IA-ACNC: <2 GPL-U/ML
CARDIOLIPIN IGM SER IA-ACNC: 2.9 MPL-U/ML
CENTROMERE B AB SER-ACNC: ABNORMAL AI
CHLORIDE SERPL-SCNC: 103 MMOL/L (ref 98–110)
CO2 SERPL-SCNC: 30 MMOL/L (ref 20–32)
CREAT SERPL-MCNC: 0.79 MG/DL (ref 0.6–1.26)
CRP SERPL-MCNC: <3 MG/L
DSDNA AB SER-ACNC: 5 IU/ML
EGFRCR SERPLBLD CKD-EPI 2021: 121 ML/MIN/1.73M2
ENA JO1 AB SER IA-ACNC: ABNORMAL AI
ENA RNP AB SER-ACNC: ABNORMAL AI
ENA SCL70 AB SER IA-ACNC: ABNORMAL AI
ENA SM AB SER IA-ACNC: ABNORMAL AI
ENA SM+RNP AB SER IA-ACNC: ABNORMAL AI
ENA SS-A AB SER IA-ACNC: ABNORMAL AI
ENA SS-B AB SER IA-ACNC: ABNORMAL AI
EOSINOPHIL # BLD AUTO: 32 CELLS/UL (ref 15–500)
EOSINOPHIL NFR BLD AUTO: 0.6 %
ERYTHROCYTE [DISTWIDTH] IN BLOOD BY AUTOMATED COUNT: 14 % (ref 11–15)
ERYTHROCYTE [SEDIMENTATION RATE] IN BLOOD BY WESTERGREN METHOD: 2 MM/H
GLUCOSE SERPL-MCNC: 82 MG/DL (ref 65–139)
HCT VFR BLD AUTO: 49.4 % (ref 38.5–50)
HGB BLD-MCNC: 16.4 G/DL (ref 13.2–17.1)
LA 2 SCREEN W REFLEX-IMP: NORMAL
LYMPHOCYTES # BLD AUTO: 1717 CELLS/UL (ref 850–3900)
LYMPHOCYTES NFR BLD AUTO: 32.4 %
MCH RBC QN AUTO: 27.3 PG (ref 27–33)
MCHC RBC AUTO-ENTMCNC: 33.2 G/DL (ref 32–36)
MCV RBC AUTO: 82.2 FL (ref 80–100)
MONOCYTES # BLD AUTO: 302 CELLS/UL (ref 200–950)
MONOCYTES NFR BLD AUTO: 5.7 %
NEUTROPHILS # BLD AUTO: 3228 CELLS/UL (ref 1500–7800)
NEUTROPHILS NFR BLD AUTO: 60.9 %
NUCLEOSOME AB SER IA-ACNC: ABNORMAL AI
PLATELET # BLD AUTO: 191 THOUSAND/UL (ref 140–400)
PMV BLD REES-ECKER: 10.1 FL (ref 7.5–12.5)
POTASSIUM SERPL-SCNC: 4.5 MMOL/L (ref 3.5–5.3)
PROT SERPL-MCNC: 7.6 G/DL (ref 6.1–8.1)
RBC # BLD AUTO: 6.01 MILLION/UL (ref 4.2–5.8)
RIBOSOMAL P AB SER-ACNC: ABNORMAL AI
SCREEN APTT: NORMAL S
SCREEN DRVVT: NORMAL S
SODIUM SERPL-SCNC: 140 MMOL/L (ref 135–146)
WBC # BLD AUTO: 5.3 THOUSAND/UL (ref 3.8–10.8)

## 2025-09-10 ENCOUNTER — APPOINTMENT (OUTPATIENT)
Dept: RHEUMATOLOGY | Facility: CLINIC | Age: 30
End: 2025-09-10
Payer: COMMERCIAL

## 2025-10-28 ENCOUNTER — APPOINTMENT (OUTPATIENT)
Facility: CLINIC | Age: 30
End: 2025-10-28
Payer: COMMERCIAL